# Patient Record
Sex: MALE | Race: OTHER | Employment: OTHER | URBAN - METROPOLITAN AREA
[De-identification: names, ages, dates, MRNs, and addresses within clinical notes are randomized per-mention and may not be internally consistent; named-entity substitution may affect disease eponyms.]

---

## 2018-01-25 PROBLEM — Z86.010 HISTORY OF COLON POLYPS: Status: ACTIVE | Noted: 2018-01-25

## 2021-06-01 PROBLEM — K14.8 LESION OF TONGUE: Status: ACTIVE | Noted: 2021-06-01

## 2022-03-19 PROBLEM — Z86.0100 HISTORY OF COLON POLYPS: Status: ACTIVE | Noted: 2018-01-25

## 2022-03-19 PROBLEM — K14.8 LESION OF TONGUE: Status: ACTIVE | Noted: 2021-06-01

## 2022-03-19 PROBLEM — Z86.010 HISTORY OF COLON POLYPS: Status: ACTIVE | Noted: 2018-01-25

## 2022-05-03 ENCOUNTER — TRANSCRIBE ORDER (OUTPATIENT)
Dept: SCHEDULING | Age: 82
End: 2022-05-03

## 2022-05-03 DIAGNOSIS — R13.10 DYSPHAGIA: Primary | ICD-10-CM

## 2022-07-23 ENCOUNTER — APPOINTMENT (OUTPATIENT)
Dept: CT IMAGING | Age: 82
End: 2022-07-23
Attending: EMERGENCY MEDICINE
Payer: MEDICARE

## 2022-07-23 ENCOUNTER — HOSPITAL ENCOUNTER (EMERGENCY)
Age: 82
Discharge: HOME | End: 2022-07-23
Attending: STUDENT IN AN ORGANIZED HEALTH CARE EDUCATION/TRAINING PROGRAM
Payer: MEDICARE

## 2022-07-23 VITALS
RESPIRATION RATE: 12 BRPM | HEIGHT: 70 IN | OXYGEN SATURATION: 100 % | WEIGHT: 196 LBS | TEMPERATURE: 98.4 F | DIASTOLIC BLOOD PRESSURE: 69 MMHG | BODY MASS INDEX: 28.06 KG/M2 | HEART RATE: 56 BPM | SYSTOLIC BLOOD PRESSURE: 160 MMHG

## 2022-07-23 DIAGNOSIS — S01.21XA LACERATION OF NOSE, INITIAL ENCOUNTER: Primary | ICD-10-CM

## 2022-07-23 DIAGNOSIS — S09.90XA CLOSED HEAD INJURY, INITIAL ENCOUNTER: ICD-10-CM

## 2022-07-23 DIAGNOSIS — R04.0 EPISTAXIS: ICD-10-CM

## 2022-07-23 PROCEDURE — 70450 CT HEAD/BRAIN W/O DYE: CPT

## 2022-07-23 PROCEDURE — 74011250636 HC RX REV CODE- 250/636: Performed by: EMERGENCY MEDICINE

## 2022-07-23 PROCEDURE — 90715 TDAP VACCINE 7 YRS/> IM: CPT | Performed by: EMERGENCY MEDICINE

## 2022-07-23 PROCEDURE — 70486 CT MAXILLOFACIAL W/O DYE: CPT

## 2022-07-23 PROCEDURE — 99284 EMERGENCY DEPT VISIT MOD MDM: CPT

## 2022-07-23 PROCEDURE — 72125 CT NECK SPINE W/O DYE: CPT

## 2022-07-23 PROCEDURE — 90471 IMMUNIZATION ADMIN: CPT

## 2022-07-23 RX ADMIN — TETANUS TOXOID, REDUCED DIPHTHERIA TOXOID AND ACELLULAR PERTUSSIS VACCINE, ADSORBED 0.5 ML: 5; 2.5; 8; 8; 2.5 SUSPENSION INTRAMUSCULAR at 14:38

## 2022-07-23 NOTE — ED PROVIDER NOTES
EMERGENCY DEPARTMENT HISTORY AND PHYSICAL EXAM    Date: 2022  Patient Name: Markus Morton    History of Presenting Illness     No chief complaint on file. History Provided By: Patient    Additional History (Context): Markus Morton is a 81 y.o. male with diabetes, hypertension, and hyperlipidemia who presents with facial injury today. He was at a  home and was walking back outside down for 5 steps and he forgot that there was an additional step off to the curb and missed stepped. Denies any premonitory dizziness lightheadedness chest pain shortness of breath or palpitations. He did not have syncope. He is on a blood thinner for his A. fib. Denies vomiting. PCP: Tanvi Yun MD    Current Facility-Administered Medications   Medication Dose Route Frequency Provider Last Rate Last Admin    diph,Rohith(AC),Tet Vac-PF (BOOSTRIX) suspension 0.5 mL  0.5 mL IntraMUSCular PRIOR TO DISCHARGE Michelle Cueto PA         Current Outpatient Medications   Medication Sig Dispense Refill    brimonidine (ALPHAGAN) 0.2 % ophthalmic solution INSTILL 1 DROP INTO BOTH EYES 3 TIMES A DAY      levoFLOXacin (LEVAQUIN) 750 mg tablet Take 1 Tablet by mouth daily. Take morning of the biopsy 1 Tablet 0    lisinopriL (PRINIVIL, ZESTRIL) 10 mg tablet Take 10 mg by mouth daily. tadalafiL (Cialis) 5 mg tablet Take 1 Tablet by mouth daily. 90 Tablet 3    omeprazole (PRILOSEC) 20 mg capsule daily. mometasone (NASONEX) 50 mcg/actuation nasal spray USE 2 SPRAYS IN EACH NOSTRIL TWICE DAILY      latanoprost (XALATAN) 0.005 % ophthalmic solution INSTILL 1 DROP INTO BOTH EYES AT BEDTIME      dorzolamide-timoloL (COSOPT) 22.3-6.8 mg/mL ophthalmic solution INSTILL 1 DROP 3 TIMES A DAY INTO BOTH EYES      metFORMIN (GLUCOPHAGE) 500 mg tablet TAKE 1 TABLET BY MOUTH TWICE A DAY WITH MEALS      atorvastatin (LIPITOR) 10 mg tablet atorvastatin 10 mg tablet   Take 1 tablet every day by oral route.          Past History     Past Medical History:  Past Medical History:   Diagnosis Date    Arthritis     Colon polyps     Diabetes ([address]     Elevated PSA     Glaucoma     Hyperlipidemia     Hypertension     Osteoarthritis     Tongue lesion        Past Surgical History:  Past Surgical History:   Procedure Laterality Date    COLONOSCOPY N/A 1/25/2018    COLONOSCOPY WITH BXS performed by Jamshid De Los Santos MD at [facility] ENDOSCOPY    HX COLONOSCOPY      HX TONSILLECTOMY      HX UROLOGICAL      CIRCUMCISION    HX UROLOGICAL  05/16/2022    TRANSRECTAL ULTRASOUND AND BIOPSY OF PROSTATE ; Shabbir guevara       Family History:  Family History   Problem Relation Age of Onset    Other Father         GLAUCOMA       Social History:  Social History     Tobacco Use    Smoking status: Former     Packs/day: 1.00     Years: 2.00     Pack years: 2.00     Types: Cigarettes    Smokeless tobacco: Never    Tobacco comments:     quit smoling 50 years ago   Substance Use Topics    Alcohol use: No    Drug use: No       Allergies:  No Known Allergies      Review of Systems   Review of Systems   Constitutional:  Negative for fever. HENT: Negative. Eyes:  Negative for visual disturbance. Respiratory:  Negative for shortness of breath. Cardiovascular:  Negative for chest pain and palpitations. Gastrointestinal:  Negative for nausea and vomiting. Endocrine: Negative. Genitourinary: Negative. Musculoskeletal: Negative. Negative for arthralgias and neck pain. Skin:  Positive for wound. Allergic/Immunologic: Negative. Neurological:  Negative for dizziness, syncope, weakness, light-headedness, numbness and headaches. Hematological:  Bruises/bleeds easily. Psychiatric/Behavioral: Negative. All Other Systems Negative  Physical Exam   There were no vitals filed for this visit. Physical Exam  Vitals and nursing note reviewed. Constitutional:       General: He is not in acute distress. Appearance: He is well-developed.  He is not ill-appearing, toxic-appearing or diaphoretic. HENT:      Head: Normocephalic. Comments: Skin tear to nasal bridge approx 3mm     Nose:      Comments: Left jared epistaxis, controlled. No blood in posterior pharynx. Neck:      Thyroid: No thyromegaly. Vascular: No carotid bruit. Trachea: No tracheal deviation. Cardiovascular:      Rate and Rhythm: Normal rate. Rhythm irregular. Heart sounds: Normal heart sounds. No murmur heard. No friction rub. No gallop. Pulmonary:      Effort: Pulmonary effort is normal. No respiratory distress. Breath sounds: Normal breath sounds. No stridor. No wheezing or rales. Chest:      Chest wall: No tenderness. Abdominal:      General: There is no distension. Palpations: Abdomen is soft. There is no mass. Tenderness: There is no abdominal tenderness. There is no guarding or rebound. Musculoskeletal:         General: Normal range of motion. Cervical back: Normal range of motion and neck supple. Skin:     General: Skin is warm and dry. Coloration: Skin is not pale. Neurological:      Mental Status: He is alert and oriented to person, place, and time. Psychiatric:         Speech: Speech normal.         Behavior: Behavior normal.         Thought Content: Thought content normal.         Judgment: Judgment normal.            Diagnostic Study Results     Labs -   No results found for this or any previous visit (from the past 12 hour(s)). Radiologic Studies -   CT HEAD WO CONT   Final Result      No CT evidence of acute intracranial pathology. Very mild burden of presumed chronic white matter microvascular ischemic changes   and cerebral volume loss. CT MAXILLOFACIAL WO CONT   Final Result      No evidence of acute facial fracture. Mild sinus mucosal disease. CT SPINE CERV WO CONT   Final Result      No evidence of acute fracture. Rather pronounced multilevel degenerative changes as detailed above. Mild anterolisthesis of C4 relative to C3 and C5. CT Results  (Last 48 hours)                 07/23/22 1321  CT HEAD WO CONT Final result    Impression:      No CT evidence of acute intracranial pathology. Very mild burden of presumed chronic white matter microvascular ischemic changes   and cerebral volume loss. Narrative:  NONCONTRAST HEAD CT       INDICATION: Above. Status post fall today with facial/head injury, on blood   thinner. COMPARISON: None       TECHNIQUE: Serial axial CT images through the brain were obtained without   administration of intravenous contrast. Additional coronal and sagittal   reformation images were also performed. All CT scans at this facility are performed using dose optimization technique as   appropriate to the performed exam, to include automated exposure control,   adjustment of the mA and/or kV according to patient's size (Including   appropriate matching for site-specific examinations), or use of iterative   reconstruction technique. FINDINGS:       There is mild diffuse prominence of the cortical sulci compatible with cerebral   volume loss, not grossly remarkable for the patient's age. There is no evidence of acute intracranial hemorrhage. No edema, midline shift or other mass effect is seen. No mass lesion   identified. There is very mild decreased attenuation in the predominantly deep   periventricular white matter compatible with nonspecific white matter disease,   likely chronic microvascular ischemic changes. No evidence of acute ischemic stroke/ cerebrovascular accident (CVA) is   identified. Head CT is often insensitive early to acute ischemic stroke. Orbits and paranasal sinuses better assessed on dedicated maxillofacial CT   reported separately. The mastoid air cells are aerated  The calvarium appears   intact.            07/23/22 1321  CT MAXILLOFACIAL WO CONT Final result    Impression: No evidence of acute facial fracture. Mild sinus mucosal disease. Narrative:  Facial CT scan, non contrast       CPT CODE: 27951       INDICATION: Above. Face/head trauma status post fall today. COMPARISON: No priors in PACS. Reference concurrent head and cervical spine CT   scans. TECHNIQUE: Serial axial CT images through the facial bones were helically   acquired spanning from the frontal sinuses through the mandible without   administration of IV contrast.  Coronal and sagittal reformation images were   also performed. All CT scans at this facility are performed using dose optimization technique as   appropriate to the performed exam, to include automated exposure control,   adjustment of the mA and/or kV according to patient's size (Including   appropriate matching for site-specific examinations), or use of iterative   reconstruction technique. FINDINGS:        No evidence of acute fracture is seen. The orbits appear intact. The globes   appear grossly round and symmetric. Retroconal intraorbital contents appear   grossly symmetric and intact. Curvilinear metallic density along the lateral   aspect of the right globe, presumably postsurgical.  Temporomandibular joints   appear grossly intact. Tiny mucous retention cyst versus polyp posterior right maxillary sinus. Mild   mucosal thickening in ethmoid air cells. No air-fluid levels. No gross nasal   septal deviation. Assessment of soft tissues is limited on noncontrast study, without gross   hematoma/soft tissue swelling identified. 07/23/22 1321  CT SPINE CERV WO CONT Final result    Impression:      No evidence of acute fracture. Rather pronounced multilevel degenerative changes as detailed above. Mild anterolisthesis of C4 relative to C3 and C5. Narrative:  CT SCAN OF THE CERVICAL SPINE, NON CONTRAST, WITH SAGITTAL AND CORONAL   REFORMATIONS       INDICATION: Above.  Facial/head trauma status post fall today, on blood thinner. COMPARISON: None       TECHNIQUE:  Serial axial CT images through the cervical spine were obtained   using helical technique from the skull base through C7 without administration of   intravenous contrast. Sagittal and coronal reformations were also performed. All CT scans at this facility are performed using dose optimization technique as   appropriate to the performed exam, to include automated exposure control,   adjustment of the mA and/or kV according to patient's size (Including   appropriate matching for site-specific examinations), or use of iterative   reconstruction technique. FINDINGS:       There is no evidence of acute fracture. No evidence of prevertebral soft tissue   swelling/hematoma is seen. The dens is intact. The skull base/C1/C2 relations   are maintained. The heights of the vertebral bodies are grossly maintained. Rather pronounced multilevel degenerative changes. There is severely decreased   disc space height at C3-C4 and C5-C6, C6-C7. Very large osteophytosis along the   anterior vertebral bodies, most conspicuous at C4 and C5. Mild anterolisthesis   of C4 relative to C3 and C5. Degenerative changes also noted at C1-C2 and at   facet joints, most conspicuous at C2-C3 bilaterally. Neural foraminal narrowing   best seen on the left at C3-C4 and bilaterally at C5-C6, C6-C7. CXR Results  (Last 48 hours)      None              Medical Decision Making   I am the first provider for this patient. I reviewed the vital signs, available nursing notes, past medical history, past surgical history, family history and social history. Vital Signs-Reviewed the patient's vital signs. Records Reviewed: Nursing Notes    Procedures:  Procedures    Provider Notes (Medical Decision Making): Skin tear to nasal bridge repaired with Steri-Strips. No acute fractures intracranial hemorrhage.   Patient is doing well and ready for discharge after his tetanus is updated. Advised him that if his epistaxis were to return to blow his nose use Afrin and apply pressure holding face and chin down. MED RECONCILIATION:  Current Facility-Administered Medications   Medication Dose Route Frequency    diph,Pertuss(AC),Tet Vac-PF (BOOSTRIX) suspension 0.5 mL  0.5 mL IntraMUSCular PRIOR TO DISCHARGE     Current Outpatient Medications   Medication Sig    brimonidine (ALPHAGAN) 0.2 % ophthalmic solution INSTILL 1 DROP INTO BOTH EYES 3 TIMES A DAY    levoFLOXacin (LEVAQUIN) 750 mg tablet Take 1 Tablet by mouth daily. Take morning of the biopsy    lisinopriL (PRINIVIL, ZESTRIL) 10 mg tablet Take 10 mg by mouth daily. tadalafiL (Cialis) 5 mg tablet Take 1 Tablet by mouth daily. omeprazole (PRILOSEC) 20 mg capsule daily. mometasone (NASONEX) 50 mcg/actuation nasal spray USE 2 SPRAYS IN EACH NOSTRIL TWICE DAILY    latanoprost (XALATAN) 0.005 % ophthalmic solution INSTILL 1 DROP INTO BOTH EYES AT BEDTIME    dorzolamide-timoloL (COSOPT) 22.3-6.8 mg/mL ophthalmic solution INSTILL 1 DROP 3 TIMES A DAY INTO BOTH EYES    metFORMIN (GLUCOPHAGE) 500 mg tablet TAKE 1 TABLET BY MOUTH TWICE A DAY WITH MEALS    atorvastatin (LIPITOR) 10 mg tablet atorvastatin 10 mg tablet   Take 1 tablet every day by oral route. Disposition:  home    DISCHARGE NOTE:   2:13 PM    Pt has been reexamined. Patient has no new complaints, changes, or physical findings. Care plan outlined and precautions discussed. Results of CT were reviewed with the patient. All medications were reviewed with the patient. All of pt's questions and concerns were addressed. Patient was instructed and agrees to follow up with PCP, as well as to return to the ED upon further deterioration. Patient is ready to go home.     Follow-up Information       Follow up With Specialties Details Why Contact Info    Arline Sanders MD Internal Medicine Physician Schedule an appointment as soon as possible for a visit in 2 days  [address]  [phone]      [facility] EMERGENCY DEPT Emergency Medicine  If symptoms worsen return immediately [address]  [phone]            Current Discharge Medication List          Diagnosis     Clinical Impression:   1. Laceration of nose, initial encounter    2.  Closed head injury, initial encounter    3. Epistaxis

## 2022-09-08 ENCOUNTER — HOSPITAL ENCOUNTER (OUTPATIENT)
Dept: RADIATION THERAPY | Age: 82
Discharge: HOME | End: 2022-09-08
Payer: MEDICARE

## 2022-09-08 PROCEDURE — 99205 OFFICE O/P NEW HI 60 MIN: CPT | Performed by: RADIOLOGY

## 2022-09-08 PROCEDURE — 99211 OFF/OP EST MAY X REQ PHY/QHP: CPT

## 2022-09-15 ENCOUNTER — TRANSCRIBE ORDER (OUTPATIENT)
Dept: RADIATION THERAPY | Age: 82
End: 2022-09-15

## 2022-09-15 DIAGNOSIS — C61 MALIGNANT NEOPLASM OF PROSTATE (HCC): Primary | ICD-10-CM

## 2022-09-20 ENCOUNTER — HOSPITAL ENCOUNTER (OUTPATIENT)
Dept: PREADMISSION TESTING | Age: 82
Discharge: HOME | End: 2022-09-20
Payer: MEDICARE

## 2022-09-20 DIAGNOSIS — C61 MALIGNANT NEOPLASM OF PROSTATE (HCC): ICD-10-CM

## 2022-09-20 LAB
ANION GAP SERPL CALC-SCNC: 5 MMOL/L (ref 3–18)
APPEARANCE UR: CLEAR
ATRIAL RATE: 49 BPM
BACTERIA URNS QL MICRO: ABNORMAL /HPF
BASOPHILS # BLD: 0 K/UL (ref 0–0.1)
BASOPHILS NFR BLD: 1 % (ref 0–2)
BILIRUB UR QL: NEGATIVE
BUN SERPL-MCNC: 23 MG/DL (ref 7–18)
BUN/CREAT SERPL: 13 (ref 12–20)
CALCIUM SERPL-MCNC: 9 MG/DL (ref 8.5–10.1)
CALCULATED P AXIS, ECG09: 30 DEGREES
CALCULATED R AXIS, ECG10: 11 DEGREES
CALCULATED T AXIS, ECG11: -118 DEGREES
CHLORIDE SERPL-SCNC: 110 MMOL/L (ref 100–111)
CO2 SERPL-SCNC: 27 MMOL/L (ref 21–32)
COLOR UR: YELLOW
CREAT SERPL-MCNC: 1.75 MG/DL (ref 0.6–1.3)
DIAGNOSIS, 93000: NORMAL
DIFFERENTIAL METHOD BLD: ABNORMAL
EOSINOPHIL # BLD: 0.2 K/UL (ref 0–0.4)
EOSINOPHIL NFR BLD: 3 % (ref 0–5)
EPITH CASTS URNS QL MICRO: ABNORMAL /LPF (ref 0–5)
ERYTHROCYTE [DISTWIDTH] IN BLOOD BY AUTOMATED COUNT: 12.8 % (ref 11.6–14.5)
GLUCOSE SERPL-MCNC: 104 MG/DL (ref 74–99)
GLUCOSE UR STRIP.AUTO-MCNC: NEGATIVE MG/DL
HCT VFR BLD AUTO: 30.9 % (ref 36–48)
HGB BLD-MCNC: 9.8 G/DL (ref 13–16)
HGB UR QL STRIP: NEGATIVE
IMM GRANULOCYTES # BLD AUTO: 0 K/UL (ref 0–0.04)
IMM GRANULOCYTES NFR BLD AUTO: 0 % (ref 0–0.5)
KETONES UR QL STRIP.AUTO: NEGATIVE MG/DL
LEUKOCYTE ESTERASE UR QL STRIP.AUTO: NEGATIVE
LYMPHOCYTES # BLD: 2.4 K/UL (ref 0.9–3.6)
LYMPHOCYTES NFR BLD: 47 % (ref 21–52)
MCH RBC QN AUTO: 30.7 PG (ref 24–34)
MCHC RBC AUTO-ENTMCNC: 31.7 G/DL (ref 31–37)
MCV RBC AUTO: 96.9 FL (ref 78–100)
MONOCYTES # BLD: 0.4 K/UL (ref 0.05–1.2)
MONOCYTES NFR BLD: 9 % (ref 3–10)
NEUTS SEG # BLD: 2.1 K/UL (ref 1.8–8)
NEUTS SEG NFR BLD: 40 % (ref 40–73)
NITRITE UR QL STRIP.AUTO: NEGATIVE
NRBC # BLD: 0 K/UL (ref 0–0.01)
NRBC BLD-RTO: 0 PER 100 WBC
P-R INTERVAL, ECG05: 200 MS
PH UR STRIP: 5.5 [PH] (ref 5–8)
PLATELET # BLD AUTO: 152 K/UL (ref 135–420)
PMV BLD AUTO: 11 FL (ref 9.2–11.8)
POTASSIUM SERPL-SCNC: 5.2 MMOL/L (ref 3.5–5.5)
PROT UR STRIP-MCNC: ABNORMAL MG/DL
Q-T INTERVAL, ECG07: 442 MS
QRS DURATION, ECG06: 98 MS
QTC CALCULATION (BEZET), ECG08: 399 MS
RBC # BLD AUTO: 3.19 M/UL (ref 4.35–5.65)
RBC #/AREA URNS HPF: ABNORMAL /HPF (ref 0–5)
SODIUM SERPL-SCNC: 142 MMOL/L (ref 136–145)
SP GR UR REFRACTOMETRY: 1.02 (ref 1–1.03)
UROBILINOGEN UR QL STRIP.AUTO: 0.2 EU/DL (ref 0.2–1)
VENTRICULAR RATE, ECG03: 49 BPM
WBC # BLD AUTO: 5.2 K/UL (ref 4.6–13.2)
WBC URNS QL MICRO: ABNORMAL /HPF (ref 0–4)

## 2022-09-20 PROCEDURE — 36415 COLL VENOUS BLD VENIPUNCTURE: CPT

## 2022-09-20 PROCEDURE — 93005 ELECTROCARDIOGRAM TRACING: CPT

## 2022-09-20 PROCEDURE — 85025 COMPLETE CBC W/AUTO DIFF WBC: CPT

## 2022-09-20 PROCEDURE — 81001 URINALYSIS AUTO W/SCOPE: CPT

## 2022-09-20 PROCEDURE — 80048 BASIC METABOLIC PNL TOTAL CA: CPT

## 2022-09-20 PROCEDURE — 87086 URINE CULTURE/COLONY COUNT: CPT

## 2022-09-21 LAB
BACTERIA SPEC CULT: NORMAL
SERVICE CMNT-IMP: NORMAL

## 2022-09-22 ENCOUNTER — TRANSCRIBE ORDER (OUTPATIENT)
Dept: SCHEDULING | Age: 82
End: 2022-09-22

## 2022-09-22 DIAGNOSIS — C61 PROSTATIC CANCER (HCC): Primary | ICD-10-CM

## 2022-10-05 ENCOUNTER — ANESTHESIA EVENT (OUTPATIENT)
Dept: SURGERY | Age: 82
End: 2022-10-05
Payer: MEDICARE

## 2022-10-05 NOTE — H&P
HISTORY AND PHYSICAL - RADIATION ONCOLOGY    Patient: Echo Mcwilliams        YOB: 1940  Patient ID: [id number]       Referring MD:  Champ Navarro  Radiation Oncologist: Dr. Catia Jenkins  Patient Diagnosis:  C61 - Malignant neoplasm of prostate, Diagnosed 5/16/2022 (Active) Stage IIIC, T1c, N0, M0, P>=10<20, G5    AJCC Staging has been reviewed. IDENTIFICATION:   81year old male with high risk prostate cancer (cT1c, GS 5+4, GS 4+5, PNI+, volume 28 mL, PSA 12.72) referred by Dr. Cristo Snyder for radiotherapy management of disease. HISTORY OF PRESENT ILLNESS: Mr. Андрей Ashby is an 81year old male with high risk prostate cancer. He has been followed for elevated PSA, which prompted further work up after increasing to 12.99 in December 2021. Prostate MRI was obtaiend March 28, 2022, which revealed highly suspicious lesion in the right anterior peripheral zone at the apex with broad abutment with the urethra and anterior bulging worrisome for extraprostatic extension and a left posterolateral peripheral zone lesion. TRUS biopsy was performed on May 16, 2022. Pathology demonstrated GS 5+9 in left base with PNI, left lateral apex, left lateral peripheral zone LILLIAN, and GS 4+5 in right anterior peripheral zone LILLIAN, left apex, left mid with PNI, left lateral mid with PNI, and left lateral base. 13 of 20 cores were positive, involving 20-99%. PSMA PET was obtained on July 13, 2022, which noted abnormal uptake in the prostate compatible with viable malignancy with no evidence of radiotracer avid metastatic disease. Current PSA is 12.72. Dr. Cristo Snyder has referred Mr. Андрей sAhby for radiotherapy management of disease. ADT was initiated with Degarelix on August 22, 2022. Today, Mr. Андрей Ashby feels well. He reports nocturia x3, weak stream, mild uinrary/frequency, and occasional intermittent stream. He denies hematuria, dysuria, diarrhea, bloody stools, abdominal pain, and bone pain.  He is a previous smoker, having smoked 1 ppd previously. There is no personal history of collagen vascular disorder, pacemaker, ulcerative colitis, Crohn's disease, or prior radiotherapy. Family history includes sister with breast cancer and a nephew with prostate cancer. Baseline:  PSA: 12.72  IPSS: 14  UQOL: 2  IIEF5: 17  EPICCP: 8    PSA History:  08/2022 - 12.72  12/2021 - 12.99  11/2021 - 13.46  05/2021 - 8.5  11/2020 - 7.76  09/2020 - 7.8  05/2020 - 4.9    STAGING:   C61 - Malignant neoplasm of prostate, Diagnosed 5/16/2022 (Active) Stage IIIC, T1c, N0, M0, P>=10<20, G5    PATHOLOGY:  C61 - Malignant neoplasm of prostate    Cell Histology Category: Adenocarcinoma; Type: Adenocarcinoma, NOS   Mayi Score Predominant/Major: 5; Secondary/Minor: 4; Total: 9   Core Assessment Taken: 20; Positive: 15       Medical History:  - Cancer (Prostate) ,  - diabetes,  - glaucoma,  - hypercholesterolemia,  - hypertension,  - osteoarthritis. Surgical History: Biopsy on 5/16/2022 (TRUS), colonoscopy on 1/25/2018 and tonsillectomy. Allergies:    None reported. Current Medications: Atorvastatin Calcium (10 mg) Tablet Oral   Brimonidine Tartrate 1 Drop(s) (of 0.2 %) Solution Ophthalmic t.i.d.   Dorzolamide HCl-Timolol Mal PF (22.3-6.8 mg/mL) Solution Ophthalmic t.i.d. Latanoprost (0.005 %) Solution Ophthalmic at bedtime   Lisinopril (10 mg) Tablet Oral daily   metFORMIN HCl (500 mg) Tablet Oral b.i.d.   Mometasone Furoate 2 Spray(s) (of 50 mcg/act) Aerosol Inhalation b.i.d. Tadalafil (5 mg) Tablet Oral daily   Timolol Maleate 1 Drop(s) (of 0.5 %) Solution Ophthalmic b.i.d. PQRS MEDICATION RECONCILIATION:  Medications documented and reviewed    Family History: Sister has experienced Cancer. Social History: Last screened on 9/7/2022 - Yes - but has quit. Smoked 1.0 pack/day. Last screened on 9/7/2022 - Never drank. Patient indicated use of the following products: cigarettes.  Patient indicated access to the following support systems: Adequate transportation available for expected visits, Lives with spouse, significant other, family, or friends, and Supportive family/friends willing to assist with needs. Patient indicated the following nutritional habits: Regular meals. Patient indicated participation in the following forms of activity: Daily activities. Education: high school  Employment:  with [employer], retired. Review of Systems:    Constitutional Denies lack of appetite, fatigue, fever, lethargy, malaise, night sweats, rigors / chills and change in weight. Allergic/Immunologic Denies allergies and adverse reactions. Head Denies alopecia. Eyes Denies blurred vision, double vision, lacrimation, night blindness, visual difficulties and photophobia. h/o glaucoma   ENMT Denies dysphagia, ear pain, epistaxis, esophagitis, problems with hearing, mouth dryness, oral bleeding, otitis, sinusitis, sputum production, stomatitis, altered taste and tinnitus. Neck Denies neck masses, muscle weakness, neck pain, decreased range of motion and swelling of the neck. Integumentary Denies alopecia, blistering, bruising, dry skin, facial burning, nail changes, photosensitivity, pruritus, rash and urticaria. Cardiovascular Denies arrhythmias, chest pain, dyspnea, edema, orthopnea and palpitations. Respiratory Complains of mild dyspnea associated with normal activity which is relieved by rest. Denies cough, hemoptysis, hiccoughs, pleuritic chest pain and wheezing. Gastrointestinal Denies abdominal pain, change in bowel habits, constipation, diarrhea, heartburn / dyspepsia, hematemesis, hematochezia, hemorrhoids, melena / GI bleeding, nausea, pain / cramping, satiety and vomiting. ROS Genitourinary (M) Complains of nocturia 1-3 times/night which is managed by frequent timed voiding.  Denies dysuria, frequency, hematuria, impotence, incontinence, renal stone disease, retrograde ejaculation, scrotal swelling, urgency and urine color change. Musculoskeletal Denies arthritis, bone pain, joint pain, muscle weakness and decreased range of motion. Neurologic Complains of mild headaches which is relieved by analgesics. Denies disorientation, dizziness, abnormal gait, insomnia, memory loss, motor weakness, sensory problems, paralysis, seizure and stroke. Psychiatric Denies delusions, hallucinations, mood swings, depression and euphoria. Endocrine Complains of Type 2 diabetes which is controlled with medication. Denies hot flashes, menstrual irregularities and thyroid disease. Hematologic/Lymphatic Denies easy bruising and tender or enlarged lymph nodes. PERFORMANCE STATUS: 1 - No physically strenuous activity, but ambulatory and able to carry out light or sedentary work (e.g. office work, light house work). (ECOG)    Vitals: Performed on 9/8/2022 10:57 AM   BMI - 29.331 kg/m2 (HIGH) -    Height - 68 in -    Weight - 192.9 lbs -    Temperature - 97.6 F -    Pulse - 50 /min (LOW) -    Respiration - 16 /min -    O2 Sat - 100 % -    Pain - 0 -    Fatigue - 0 -    Fall Risk - yes -    BP - 140/ 63 mm(hg)(/LOW) - ;    Physical Exam:    Constitutional No evidence of impaired alertness, inadequate appearance, premature or advanced chronologic age, uncooperativeness, developmental delays, altered mood and affect and disorientation. Head No evidence of abnormal cephalic and scars. Eyes No evidence of conjunctivitis, nonreactive pupil(s) and scleral abnormalities. Neck No evidence of tender or enlarged lymph nodes, neck abnormalities, restricted range of motion and enlarged thyroid gland. Chest No evidence of chest abnormalities and tender or enlarged lymph nodes. Cardiovascular No evidence of abnormal heart rate, heart arrhythmia and abnormal heart sounds. Respiratory No evidence of abnormal breath sounds. Abdomen No evidence of abdominal abnormalities, abnormal bowel sounds, hepatomegaly and splenomegaly.    Extremities No evidence of lower extremities abnormalities and upper extremities abnormalities. Back/Spine No evidence of reduced flexibility and abnormal spinal curvature. Musculoskeletal No evidence of bone abnormalities, joint abnormalities, compromised muscle tone and restricted range of motion. Neurologic No evidence of uncoordinated gait. Psychiatric No evidence of altered affect, lack of comprehension and disorientation. Hematologic/Lymphatic No evidence of tender or enlarged lymph nodes. TIME AND COUNSELING:  Up to 60 minutes were spent with the patient acquiring the vital information vital to the case. The patient was examined to verify findings as related in the HPI, as well as other areas as needed. Time was allowed for all questions about the proposed course of care to be answered. Greater than 50% time was spent face to face with the patient in counseling and coordination of care. Impression:   81year old male with high risk prostate cancer (cT1c, GS 5+4, GS 4+5, PNI+, volume 28 mL, PSA 12.72) referred by Dr. Jada Wheat for radiotherapy management of disease. Plan:   I discussed risk grouping of prostate cancer with Mr. Rodney Michelle and explained that his disease is considered high risk. I thoroughly explained the potential acute and late side effects of IMRT/IGRT including (but not limited to) urinary symptoms (frequency, urgency, nocturia, hematuria, dysuria, incontinence), fatigue, osteopenia, erectile dysfunction, dry ejaculate, infertility, diarrhea, GI bleed, bladder inflammation/injury, bowel inflammation/injury that could require surgery, rectal injury that could require surgery, and skin changes/pain/desquamation. I discussed the importance of a comfortably full bladder and reproducibly empty rectum as well as bowel management for IMRT. I explained the use of fiducial markers and SpaceOAR gel for targeting and reducing the dose to the rectum, respectively.   I then answered all of the patient's questions regarding proton therapy, and explained that there is no evidence in the literature that shows proton therapy as a superior treatment modality in comparison to photon radiation treatment for prostate cancer specifically, with regard to either long-term disease control rates or toxicity. Given Mr. Jessica Ospina LUTS, recommend standard IMRT/IGRT to treat the prostate, seminal vesicles, and pelvic nodes. Mr. Jessica Ospina questions were answered, and he expressed an understanding of the above. Mr. Micaela Johnson would like to proceed with fiducial marker and SpaceOAR placement followed by 44 fractions of IMRT/IGRT to treat his prostate cancer. We will coordinate fiducial marker and SpaceOAR placement at [facility] in October 2022. He will then undergo CT simulation and MRI pelvis for planning one week after his procedure. Plan for 7920 cGy in 44 fractions. Will include prostate, seminal vesicles, and pelvic nodes given high risk disease.

## 2022-10-06 ENCOUNTER — HOSPITAL ENCOUNTER (OUTPATIENT)
Dept: RADIATION THERAPY | Age: 82
Discharge: HOME | End: 2022-10-06
Payer: MEDICARE

## 2022-10-06 ENCOUNTER — HOSPITAL ENCOUNTER (OUTPATIENT)
Age: 82
Setting detail: OUTPATIENT SURGERY
Discharge: HOME | End: 2022-10-06
Attending: RADIOLOGY | Admitting: RADIOLOGY
Payer: MEDICARE

## 2022-10-06 ENCOUNTER — ANESTHESIA (OUTPATIENT)
Dept: SURGERY | Age: 82
End: 2022-10-06
Payer: MEDICARE

## 2022-10-06 VITALS
DIASTOLIC BLOOD PRESSURE: 60 MMHG | HEIGHT: 70 IN | SYSTOLIC BLOOD PRESSURE: 161 MMHG | HEART RATE: 45 BPM | BODY MASS INDEX: 27.37 KG/M2 | OXYGEN SATURATION: 100 % | WEIGHT: 191.2 LBS | RESPIRATION RATE: 9 BRPM | TEMPERATURE: 96.8 F

## 2022-10-06 LAB
GLUCOSE BLD STRIP.AUTO-MCNC: 114 MG/DL (ref 70–110)
GLUCOSE BLD STRIP.AUTO-MCNC: 166 MG/DL (ref 70–110)
HBA1C MFR BLD: 7 % (ref 4.2–5.6)

## 2022-10-06 PROCEDURE — 77030015736 HC BLLN ENDOCAV CIVC -B: Performed by: RADIOLOGY

## 2022-10-06 PROCEDURE — 82962 GLUCOSE BLOOD TEST: CPT

## 2022-10-06 PROCEDURE — 74011250637 HC RX REV CODE- 250/637: Performed by: RADIOLOGY

## 2022-10-06 PROCEDURE — 55876 PLACE RT DEVICE/MARKER PROS: CPT | Performed by: RADIOLOGY

## 2022-10-06 PROCEDURE — C1889 IMPLANT/INSERT DEVICE, NOC: HCPCS | Performed by: RADIOLOGY

## 2022-10-06 PROCEDURE — 74011000250 HC RX REV CODE- 250: Performed by: RADIOLOGY

## 2022-10-06 PROCEDURE — 76210000021 HC REC RM PH II 0.5 TO 1 HR: Performed by: RADIOLOGY

## 2022-10-06 PROCEDURE — 77030040922 HC BLNKT HYPOTHRM STRY -A: Performed by: RADIOLOGY

## 2022-10-06 PROCEDURE — A4648 IMPLANTABLE TISSUE MARKER: HCPCS | Performed by: RADIOLOGY

## 2022-10-06 PROCEDURE — 76060000032 HC ANESTHESIA 0.5 TO 1 HR: Performed by: RADIOLOGY

## 2022-10-06 PROCEDURE — 74011000250 HC RX REV CODE- 250: Performed by: NURSE ANESTHETIST, CERTIFIED REGISTERED

## 2022-10-06 PROCEDURE — 99100 ANES PT EXTEME AGE<1 YR&>70: CPT | Performed by: ANESTHESIOLOGY

## 2022-10-06 PROCEDURE — 00902 ANES ANORECTAL PX: CPT | Performed by: ANESTHESIOLOGY

## 2022-10-06 PROCEDURE — 74011250636 HC RX REV CODE- 250/636: Performed by: RADIOLOGY

## 2022-10-06 PROCEDURE — 74011250636 HC RX REV CODE- 250/636: Performed by: NURSE ANESTHETIST, CERTIFIED REGISTERED

## 2022-10-06 PROCEDURE — 76210000006 HC OR PH I REC 0.5 TO 1 HR: Performed by: RADIOLOGY

## 2022-10-06 PROCEDURE — 74011636637 HC RX REV CODE- 636/637: Performed by: NURSE ANESTHETIST, CERTIFIED REGISTERED

## 2022-10-06 PROCEDURE — 99100 ANES PT EXTEME AGE<1 YR&>70: CPT | Performed by: NURSE ANESTHETIST, CERTIFIED REGISTERED

## 2022-10-06 PROCEDURE — 76010000160 HC OR TIME 0.5 TO 1 HR INTENSV-TIER 1: Performed by: RADIOLOGY

## 2022-10-06 PROCEDURE — 00902 ANES ANORECTAL PX: CPT | Performed by: NURSE ANESTHETIST, CERTIFIED REGISTERED

## 2022-10-06 PROCEDURE — 55874 TPRNL PLMT BIODEGRDABL MATRL: CPT | Performed by: RADIOLOGY

## 2022-10-06 PROCEDURE — 83036 HEMOGLOBIN GLYCOSYLATED A1C: CPT

## 2022-10-06 PROCEDURE — 74011250637 HC RX REV CODE- 250/637: Performed by: NURSE ANESTHETIST, CERTIFIED REGISTERED

## 2022-10-06 PROCEDURE — 2709999900 HC NON-CHARGEABLE SUPPLY: Performed by: RADIOLOGY

## 2022-10-06 PROCEDURE — 77030040361 HC SLV COMPR DVT MDII -B: Performed by: RADIOLOGY

## 2022-10-06 RX ORDER — PROPOFOL 10 MG/ML
INJECTION, EMULSION INTRAVENOUS AS NEEDED
Status: DISCONTINUED | OUTPATIENT
Start: 2022-10-06 | End: 2022-10-06 | Stop reason: HOSPADM

## 2022-10-06 RX ORDER — INSULIN LISPRO 100 [IU]/ML
INJECTION, SOLUTION INTRAVENOUS; SUBCUTANEOUS ONCE
Status: DISCONTINUED | OUTPATIENT
Start: 2022-10-06 | End: 2022-10-06 | Stop reason: HOSPADM

## 2022-10-06 RX ORDER — FAMOTIDINE 20 MG/1
20 TABLET, FILM COATED ORAL ONCE
Status: COMPLETED | OUTPATIENT
Start: 2022-10-06 | End: 2022-10-06

## 2022-10-06 RX ORDER — DEXTROSE MONOHYDRATE 100 MG/ML
0-250 INJECTION, SOLUTION INTRAVENOUS AS NEEDED
Status: DISCONTINUED | OUTPATIENT
Start: 2022-10-06 | End: 2022-10-06 | Stop reason: HOSPADM

## 2022-10-06 RX ORDER — MAGNESIUM SULFATE 100 %
4 CRYSTALS MISCELLANEOUS AS NEEDED
Status: DISCONTINUED | OUTPATIENT
Start: 2022-10-06 | End: 2022-10-06 | Stop reason: HOSPADM

## 2022-10-06 RX ORDER — SODIUM CHLORIDE, SODIUM LACTATE, POTASSIUM CHLORIDE, CALCIUM CHLORIDE 600; 310; 30; 20 MG/100ML; MG/100ML; MG/100ML; MG/100ML
25 INJECTION, SOLUTION INTRAVENOUS CONTINUOUS
Status: DISCONTINUED | OUTPATIENT
Start: 2022-10-06 | End: 2022-10-06 | Stop reason: HOSPADM

## 2022-10-06 RX ORDER — LIDOCAINE HYDROCHLORIDE 20 MG/ML
INJECTION, SOLUTION EPIDURAL; INFILTRATION; INTRACAUDAL; PERINEURAL AS NEEDED
Status: DISCONTINUED | OUTPATIENT
Start: 2022-10-06 | End: 2022-10-06 | Stop reason: HOSPADM

## 2022-10-06 RX ORDER — INSULIN LISPRO 100 [IU]/ML
INJECTION, SOLUTION INTRAVENOUS; SUBCUTANEOUS ONCE
Status: COMPLETED | OUTPATIENT
Start: 2022-10-06 | End: 2022-10-06

## 2022-10-06 RX ORDER — OXYCODONE AND ACETAMINOPHEN 5; 325 MG/1; MG/1
1 TABLET ORAL AS NEEDED
Status: DISCONTINUED | OUTPATIENT
Start: 2022-10-06 | End: 2022-10-06 | Stop reason: HOSPADM

## 2022-10-06 RX ORDER — EPHEDRINE SULFATE/0.9% NACL/PF 50 MG/5 ML
SYRINGE (ML) INTRAVENOUS AS NEEDED
Status: DISCONTINUED | OUTPATIENT
Start: 2022-10-06 | End: 2022-10-06 | Stop reason: HOSPADM

## 2022-10-06 RX ORDER — FENTANYL CITRATE 50 UG/ML
INJECTION, SOLUTION INTRAMUSCULAR; INTRAVENOUS AS NEEDED
Status: DISCONTINUED | OUTPATIENT
Start: 2022-10-06 | End: 2022-10-06 | Stop reason: HOSPADM

## 2022-10-06 RX ORDER — ONDANSETRON 2 MG/ML
INJECTION INTRAMUSCULAR; INTRAVENOUS AS NEEDED
Status: DISCONTINUED | OUTPATIENT
Start: 2022-10-06 | End: 2022-10-06 | Stop reason: HOSPADM

## 2022-10-06 RX ORDER — FENTANYL CITRATE 50 UG/ML
50 INJECTION, SOLUTION INTRAMUSCULAR; INTRAVENOUS
Status: DISCONTINUED | OUTPATIENT
Start: 2022-10-06 | End: 2022-10-06 | Stop reason: HOSPADM

## 2022-10-06 RX ORDER — FENTANYL CITRATE 50 UG/ML
25 INJECTION, SOLUTION INTRAMUSCULAR; INTRAVENOUS AS NEEDED
Status: DISCONTINUED | OUTPATIENT
Start: 2022-10-06 | End: 2022-10-06 | Stop reason: HOSPADM

## 2022-10-06 RX ORDER — LIDOCAINE HYDROCHLORIDE 10 MG/ML
0.1 INJECTION, SOLUTION EPIDURAL; INFILTRATION; INTRACAUDAL; PERINEURAL AS NEEDED
Status: DISCONTINUED | OUTPATIENT
Start: 2022-10-06 | End: 2022-10-06 | Stop reason: HOSPADM

## 2022-10-06 RX ADMIN — SODIUM CHLORIDE, POTASSIUM CHLORIDE, SODIUM LACTATE AND CALCIUM CHLORIDE 25 ML/HR: 600; 310; 30; 20 INJECTION, SOLUTION INTRAVENOUS at 08:09

## 2022-10-06 RX ADMIN — SODIUM PHOSPHATE, DIBASIC AND SODIUM PHOSPHATE, MONOBASIC 1 ENEMA: 7; 19 ENEMA RECTAL at 08:25

## 2022-10-06 RX ADMIN — FENTANYL CITRATE 25 MCG: 50 INJECTION, SOLUTION INTRAMUSCULAR; INTRAVENOUS at 08:56

## 2022-10-06 RX ADMIN — ONDANSETRON 4 MG: 2 INJECTION INTRAMUSCULAR; INTRAVENOUS at 09:29

## 2022-10-06 RX ADMIN — LIDOCAINE HYDROCHLORIDE 40 MG: 20 INJECTION, SOLUTION EPIDURAL; INFILTRATION; INTRACAUDAL; PERINEURAL at 08:56

## 2022-10-06 RX ADMIN — Medication 5 MG: at 09:02

## 2022-10-06 RX ADMIN — PROPOFOL 150 MG: 10 INJECTION, EMULSION INTRAVENOUS at 08:56

## 2022-10-06 RX ADMIN — CEFAZOLIN SODIUM 2 G: 1 INJECTION, POWDER, FOR SOLUTION INTRAMUSCULAR; INTRAVENOUS at 08:59

## 2022-10-06 RX ADMIN — FENTANYL CITRATE 25 MCG: 50 INJECTION, SOLUTION INTRAMUSCULAR; INTRAVENOUS at 09:19

## 2022-10-06 RX ADMIN — Medication 5 MG: at 09:10

## 2022-10-06 RX ADMIN — Medication 3 UNITS: at 08:14

## 2022-10-06 RX ADMIN — Medication 5 MG: at 09:14

## 2022-10-06 RX ADMIN — FAMOTIDINE 20 MG: 20 TABLET ORAL at 07:58

## 2022-10-06 NOTE — ANESTHESIA POSTPROCEDURE EVALUATION
Procedure(s):  TRANSRECTAL ULTRASOUND GUIDED FIDUCIAL MARKER PLACEMENT/SPACE OAR GEL. general    Anesthesia Post Evaluation      Multimodal analgesia: multimodal analgesia used between 6 hours prior to anesthesia start to PACU discharge  Patient location during evaluation: bedside  Patient participation: complete - patient participated  Level of consciousness: awake  Pain score: 0  Pain management: adequate  Airway patency: patent  Anesthetic complications: no  Cardiovascular status: stable  Respiratory status: acceptable  Hydration status: acceptable  Post anesthesia nausea and vomiting:  controlled  Final Post Anesthesia Temperature Assessment:  Normothermia (36.0-37.5 degrees C)      INITIAL Post-op Vital signs:   Vitals Value Taken Time   /58 10/06/22 1010   Temp 36.3 °C (97.4 °F) 10/06/22 1000   Pulse 45 10/06/22 1013   Resp 11 10/06/22 1013   SpO2 100 % 10/06/22 1013   Vitals shown include unvalidated device data.

## 2022-10-06 NOTE — OP NOTES
Operative Note    Patient: Milagros Phillips  YOB: 1940  MRN: [id number]    Date of Procedure: 10/6/2022     Pre-Op Diagnosis: Prostate cancer ([address] Sophie Edmund    Post-Op Diagnosis: Same as preoperative diagnosis. Procedure(s):  TRANSRECTAL ULTRASOUND GUIDED FIDUCIAL MARKER PLACEMENT/SPACE OAR GEL    Surgeon(s):  Maggie Edward MD    Surgical Assistant: None    Anesthesia: General     Estimated Blood Loss (mL):  Minimal    Complications: None    Specimens: * No specimens in log *     Implants:   Implant Name Type Inv. Item Serial No.  Lot No. LRB No. Used Action   fiducial marker kit     [id number]/A 3 Implanted       Drains: * No LDAs found *    Findings: Prostate identified on ultrasound. Detailed Description of Procedure: Mr. Marylou Araiza was brought to OR 8 at [facility]. He was then positioned on the distal end of the ultrasound table in the dorsal lithotomy position with his legs in [address] [place], [place] [address]way and his hips slightly hyperflexed. Care was taken to align the patient straight on the table. The perineum was then prepped with HIBICLENS. The Biplanar multi-frequency B & K transrectal ultrasound transducer was then carefully introduced into the rectum and attached to the digital, electronic stepper unit which was mounted to the stabilizer unit. The gland was evaluated on both transverse and longitudinal imaging. Three 18G steel trocar needles with 60 degree bevels pre-loaded with single fiducial markers each spaced 2cm were then placed in turn under direct transrectal ultrasound guidance into the right and then left lobes of the prostate. The markers on the right were placed such that the cephalic extent was near the right base. The marker on the left was implanted in the same fashion such that the caudal extent was near the left apex. The position of all three markers was verified on ultrasound imaging and deemed to be satisfactory.   Next, a 6 inch, 18G spinal needle was then placed in the posterior midline and advanced to the recto-prostatic space near midgland. 1 cc of saline was injected to confirm the presence of a good tissue plane. The liquid materials and powder provided in the [facility] kit were then mixed as per protocol and about 10 cc were simultaneously injected into the same space, giving an immediate minimum 7 mm increased spacing from the prostate to the outside of the rectal wall. At the completion of the procedure, which was tolerated well, the perineum was cleaned, and the puncture sites were covered with an antibiotic dressing. He was discharged in good condition. He was given a prescription for Ciprofloxacin 500 mg, BID x 3 days and Naproxen, with instructions to begin tomorrow. He was given an appointment to return for CT based simulation and treatment planning. The IMRT/IGRT phase of treatment will then follow soon thereafter.        Electronically Signed by Catia Jenkins MD on 10/6/2022 at 12:02 PM

## 2022-10-06 NOTE — INTERVAL H&P NOTE
Update History & Physical    The Patient's History and Physical of October 6,   The procedure - fiducial marker and SpaceOAR hydrogel placement was reviewed with the patient and I examined the patient. There was no change. The surgical site was confirmed by the patient and me. Patient did not perform enema, one will be ordered. Plan:  The risk, benefits, expected outcome, and alternative to the recommended procedure have been discussed with the patient. Patient understands and wants to proceed with the procedure.     Electronically signed by Deangelo Linder MD on 10/6/2022 at 8:23 AM

## 2022-10-06 NOTE — BRIEF OP NOTE
Brief Postoperative Note    Patient: Ehsan Bryson  YOB: 1940  MRN: [id number]    Date of Procedure: 10/6/2022     Pre-Op Diagnosis: Prostate cancer ([address] Marcia Hanley    Post-Op Diagnosis: Same as preoperative diagnosis. Procedure(s):  TRANSRECTAL ULTRASOUND GUIDED FIDUCIAL MARKER PLACEMENT/SPACE OAR GEL    Surgeon(s):  Hayden Weber MD    Surgical Assistant: None    Anesthesia: General     Estimated Blood Loss (mL): Minimal    Complications: None    Specimens: * No specimens in log *     Implants:   Implant Name Type Inv. Item Serial No.  Lot No. LRB No. Used Action   fiducial marker kit     [id number] N/A 3 Implanted       Drains: * No LDAs found *    Findings: Prostate identified on ultrasound. 3 gold fiducial markers placed, 2 on patient's right, 1 on patient's left, within the prostatic parenchyma. SpaceOAR hydrogel then injected in the fat plane posterior to the prostate, anterior to the rectum. No complications, tolerated well, patient sent to PACU.      Electronically Signed by Esther Padilla MD on 10/6/2022 at 9:56 AM

## 2022-10-06 NOTE — ANESTHESIA PREPROCEDURE EVALUATION
Relevant Problems   No relevant active problems       Anesthetic History   No history of anesthetic complications            Review of Systems / Medical History  Patient summary reviewed and pertinent labs reviewed    Pulmonary  Within defined limits                 Neuro/Psych   Within defined limits           Cardiovascular    Hypertension: well controlled              Exercise tolerance: >4 METS     GI/Hepatic/Renal  Within defined limits              Endo/Other    Diabetes: well controlled, type 2    Arthritis and cancer (Prostate cancer)     Other Findings            Physical Exam    Airway  Mallampati: II  TM Distance: 4 - 6 cm  Neck ROM: normal range of motion   Mouth opening: Normal     Cardiovascular  Regular rate and rhythm,  S1 and S2 normal,  no murmur, click, rub, or gallop             Dental      Comments: 4 teeth in mouth   Pulmonary  Breath sounds clear to auscultation               Abdominal  GI exam deferred       Other Findings            Anesthetic Plan    ASA: 3  Anesthesia type: general          Induction: Intravenous  Anesthetic plan and risks discussed with: Patient

## 2022-10-06 NOTE — DISCHARGE INSTRUCTIONS
DISCHARGE SUMMARY from Nurse    PATIENT INSTRUCTIONS:    After general anesthesia or intravenous sedation, for 24 hours or while taking prescription Narcotics:  Limit your activities  Do not drive and operate hazardous machinery  Do not make important personal or business decisions  Do  not drink alcoholic beverages  If you have not urinated within 8 hours after discharge, please contact your surgeon on call. Report the following to your surgeon:  Excessive pain, swelling, redness or odor of or around the surgical area  Temperature over 100.5  Nausea and vomiting lasting longer than 4 hours or if unable to take medications  Any signs of decreased circulation or nerve impairment to extremity: change in color, persistent  numbness, tingling, coldness or increase pain  Any questions      These are general instructions for a healthy lifestyle:    No smoking/ No tobacco products/ Avoid exposure to second hand smoke  Surgeon General's Warning:  Quitting smoking now greatly reduces serious risk to your health. Obesity, smoking, and sedentary lifestyle greatly increases your risk for illness    A healthy diet, regular physical exercise & weight monitoring are important for maintaining a healthy lifestyle    You may be retaining fluid if you have a history of heart failure or if you experience any of the following symptoms:  Weight gain of 3 pounds or more overnight or 5 pounds in a week, increased swelling in our hands or feet or shortness of breath while lying flat in bed. Please call your doctor as soon as you notice any of these symptoms; do not wait until your next office visit. The discharge information has been reviewed with the patient and wife. The patient and wife verbalized understanding.   Discharge medications reviewed with the patient and wife and appropriate educational materials and side effects teaching were provided.   ___________________________________________________________________________________________________________________________________  Patient armband removed and shredded

## 2022-10-18 ENCOUNTER — HOSPITAL ENCOUNTER (OUTPATIENT)
Age: 82
Discharge: HOME | End: 2022-10-18
Attending: RADIOLOGY
Payer: MEDICARE

## 2022-10-18 ENCOUNTER — APPOINTMENT (OUTPATIENT)
Dept: RADIATION THERAPY | Age: 82
End: 2022-10-18
Payer: MEDICARE

## 2022-10-18 DIAGNOSIS — C61 PROSTATIC CANCER (HCC): ICD-10-CM

## 2022-10-18 PROCEDURE — 76498 UNLISTED MR PROCEDURE: CPT

## 2022-10-19 ENCOUNTER — HOSPITAL ENCOUNTER (OUTPATIENT)
Dept: RADIATION THERAPY | Age: 82
Discharge: HOME | End: 2022-10-19
Payer: MEDICARE

## 2022-10-19 PROCEDURE — 77263 THER RADIOLOGY TX PLNG CPLX: CPT | Performed by: RADIOLOGY

## 2022-10-20 ENCOUNTER — HOSPITAL ENCOUNTER (OUTPATIENT)
Dept: RADIATION THERAPY | Age: 82
Discharge: HOME | End: 2022-10-20
Payer: MEDICARE

## 2022-10-20 ENCOUNTER — TRANSCRIBE ORDER (OUTPATIENT)
Dept: RADIATION THERAPY | Age: 82
End: 2022-10-20

## 2022-10-20 DIAGNOSIS — C61 MALIGNANT NEOPLASM OF PROSTATE (HCC): Primary | ICD-10-CM

## 2022-10-20 PROCEDURE — 77334 RADIATION TREATMENT AID(S): CPT

## 2024-09-18 ENCOUNTER — HOSPITAL ENCOUNTER (OUTPATIENT)
Facility: HOSPITAL | Age: 84
Setting detail: RECURRING SERIES
Discharge: HOME | End: 2024-09-21
Payer: MEDICARE

## 2024-09-18 PROCEDURE — 97161 PT EVAL LOW COMPLEX 20 MIN: CPT

## 2024-09-24 ENCOUNTER — HOSPITAL ENCOUNTER (OUTPATIENT)
Facility: HOSPITAL | Age: 84
Setting detail: RECURRING SERIES
End: 2024-09-24
Payer: MEDICARE

## 2024-09-24 ENCOUNTER — HOSPITAL ENCOUNTER (OUTPATIENT)
Facility: HOSPITAL | Age: 84
Setting detail: RECURRING SERIES
Discharge: HOME | End: 2024-09-27
Payer: MEDICARE

## 2024-09-24 PROCEDURE — 97530 THERAPEUTIC ACTIVITIES: CPT

## 2024-09-24 PROCEDURE — 97110 THERAPEUTIC EXERCISES: CPT

## 2024-09-24 PROCEDURE — 97112 NEUROMUSCULAR REEDUCATION: CPT

## 2024-09-24 NOTE — PROGRESS NOTES
PHYSICAL / OCCUPATIONAL THERAPY - DAILY TREATMENT NOTE    Patient Name: Arjun Jeronimo    Date: 2024    : 1940  Insurance: Payor: [employer] MEDICARE / Plan: [employer] CHOICE-PPO MEDICARE / Product Type: *No Product type* /      Patient  verified Yes     Visit #   Current / Total 2 24   Time   In / Out 4:14 4:55   Pain   In / Out 0 0   Subjective Functional Status/Changes: Pt reports that he started part of the HEP, denied any issues/concerns.     TREATMENT AREA =  Pain in left leg [M79.605]     OBJECTIVE         Therapeutic Procedures:    Tx Min Billable or 1:1 Min (if diff from Tx Min) Procedure, Rationale, Specifics   19  [id number] Therapeutic Exercise (timed):  increase ROM, strength, coordination, balance, and proprioception to improve patient's ability to progress to PLOF and address remaining functional goals. (see flow sheet as applicable)     Details if applicable:       9  74346 Neuromuscular Re-Education (timed):  improve balance, coordination, kinesthetic sense, posture, core stability and proprioception to improve patient's ability to develop conscious control of individual muscles and awareness of position of extremities in order to progress to PLOF and address remaining functional goals. (see flow sheet as applicable)     Details if applicable:     13  78552 Therapeutic Activity (timed):  use of dynamic activities replicating functional movements to increase ROM, strength, coordination, balance, and proprioception in order to improve patient's ability to progress to PLOF and address remaining functional goals.  (see flow sheet as applicable)     Details if applicable:            Details if applicable:            Details if applicable:     41 41 [facility] Totals Reminder: bill using total billable min of TIMED therapeutic procedures (example: do not include dry needle or estim unattended, both untimed codes, in totals to left)  8-22 min = 1 unit; 23-37 min = 2 units; 38-52 min = 3 units;

## 2024-10-01 ENCOUNTER — HOSPITAL ENCOUNTER (OUTPATIENT)
Facility: HOSPITAL | Age: 84
Setting detail: RECURRING SERIES
Discharge: HOME | End: 2024-10-04
Payer: MEDICARE

## 2024-10-01 PROCEDURE — 97110 THERAPEUTIC EXERCISES: CPT

## 2024-10-01 PROCEDURE — 97112 NEUROMUSCULAR REEDUCATION: CPT

## 2024-10-01 PROCEDURE — 97530 THERAPEUTIC ACTIVITIES: CPT

## 2024-10-01 NOTE — PROGRESS NOTES
PHYSICAL / OCCUPATIONAL THERAPY - DAILY TREATMENT NOTE    Patient Name: Arjun Jeronimo    Date: 10/1/2024    : 1940  Insurance: Payor: [employer] MEDICARE / Plan: [employer] CHOICE-PPO MEDICARE / Product Type: *No Product type* /      Patient  verified Yes     Visit #   Current / Total 3 24   Time   In / Out 8:20 9:00   Pain   In / Out 3 1   Subjective Functional Status/Changes: \"Im doing great today - I think things are getting better\"     TREATMENT AREA =  Pain in left leg [M79.605]     OBJECTIVE         Therapeutic Procedures:    Tx Min Billable or 1:1 Min (if diff from Tx Min) Procedure, Rationale, Specifics   22  71996 Therapeutic Exercise (timed):  increase ROM, strength, coordination, balance, and proprioception to improve patient's ability to progress to PLOF and address remaining functional goals. (see flow sheet as applicable)     Details if applicable:       10 10 03493 Neuromuscular Re-Education (timed):  improve balance, coordination, kinesthetic sense, posture, core stability and proprioception to improve patient's ability to develop conscious control of individual muscles and awareness of position of extremities in order to progress to PLOF and address remaining functional goals. (see flow sheet as applicable)     Details if applicable:     8 8 [id number] Therapeutic Activity (timed):  use of dynamic activities replicating functional movements to increase ROM, strength, coordination, balance, and proprioception in order to improve patient's ability to progress to PLOF and address remaining functional goals.  (see flow sheet as applicable)     Details if applicable:            Details if applicable:            Details if applicable:     40 40 [facility] Totals Reminder: bill using total billable min of TIMED therapeutic procedures (example: do not include dry needle or estim unattended, both untimed codes, in totals to left)  8-22 min = 1 unit; 23-37 min = 2 units; 38-52 min = 3 units; 53-67 min = 4

## 2024-10-08 ENCOUNTER — HOSPITAL ENCOUNTER (OUTPATIENT)
Facility: HOSPITAL | Age: 84
Setting detail: RECURRING SERIES
Discharge: HOME | End: 2024-10-11
Payer: MEDICARE

## 2024-10-08 PROCEDURE — 97110 THERAPEUTIC EXERCISES: CPT

## 2024-10-08 PROCEDURE — 97112 NEUROMUSCULAR REEDUCATION: CPT

## 2024-10-08 NOTE — PROGRESS NOTES
Flexion 4+ 3+      3.  Pt will improve bilateral SLS to 8 seconds to improve stability at home and within the community.   Status at last note/certification: SLS: R: <1 seconds. , L: <1 seconds  Current: progressing R - 1.53 sec, L - 3.46 sec (10/1/2024)     4.  Pt will improve bilateral hip internal rotation to 25 degrees to improve joint mobility and mobility at home and within the community.   Status at last note/certification: R-10 deg, L- 8 deg     Next PN/ RC due 10/17/24  Auth due (visit number/ date) 24 sessions, expires 12/20/24    PLAN  - Continue Plan of Care  - Upgrade activities as tolerated    Nannette Hudson, PT    10/8/2024    8:11 AM  If an interpreting service was utilized for treatment of this patient, the contents of this document represent the material reviewed with the patient via the .     Future Appointments   Date Time Provider Department Center   10/8/2024  8:20 AM [facility]   10/15/2024  8:20 AM Joaquín Barajas, ESTEBAN [facility]   10/16/2024  3:20 PM [facility] NURSE [facility] [place] Sched   10/22/2024  8:20 AM [facility]   10/29/2024  8:20 AM [facility]   1/13/2025  3:30 PM [facility] CANCER BLOOD ROOM [facility] [place] Sched   1/27/2025  3:20 PM Griselda Malik, DONNELL [facility] [place] Sched

## 2024-10-15 ENCOUNTER — HOSPITAL ENCOUNTER (OUTPATIENT)
Facility: HOSPITAL | Age: 84
Setting detail: RECURRING SERIES
Discharge: HOME | End: 2024-10-18
Payer: MEDICARE

## 2024-10-15 PROCEDURE — 97112 NEUROMUSCULAR REEDUCATION: CPT

## 2024-10-15 PROCEDURE — 97110 THERAPEUTIC EXERCISES: CPT

## 2024-10-15 PROCEDURE — 97530 THERAPEUTIC ACTIVITIES: CPT

## 2024-10-15 NOTE — PROGRESS NOTES
PHYSICAL / OCCUPATIONAL THERAPY - DAILY TREATMENT NOTE    Patient Name: Arjun Jeronimo    Date: 10/15/2024    : 1940  Insurance: Payor: [employer] MEDICARE / Plan: [employer] CHOICE-PPO MEDICARE / Product Type: *No Product type* /      Patient  verified Yes     Visit #   Current / Total 5 24   Time   In / Out 8:22 9:15   Pain   In / Out 5/10- when attempting to stand, 0/10- at rest.  0/10- rest.    Subjective Functional Status/Changes: I really only get pain when attempting certain movements.      TREATMENT AREA =  Pain in left leg [M79.605]     OBJECTIVE         Therapeutic Procedures:    Tx Min Billable or 1:1 Min (if diff from Tx Min) Procedure, Rationale, Specifics   17  04339 Therapeutic Activity (timed):  use of dynamic activities replicating functional movements to increase ROM, strength, coordination, balance, and proprioception in order to improve patient's ability to progress to PLOF and address remaining functional goals.  (see flow sheet as applicable)     Details if applicable:        21810 Neuromuscular Re-Education (timed):  improve balance, coordination, kinesthetic sense, posture, core stability and proprioception to improve patient's ability to develop conscious control of individual muscles and awareness of position of extremities in order to progress to PLOF and address remaining functional goals. (see flow sheet as applicable)     Details if applicable:      36137 Therapeutic Exercise (timed):  increase ROM, strength, coordination, balance, and proprioception to improve patient's ability to progress to PLOF and address remaining functional goals. (see flow sheet as applicable)     Details if applicable:               53 53 [facility] Totals Reminder: bill using total billable min of TIMED therapeutic procedures (example: do not include dry needle or estim unattended, both untimed codes, in totals to left)  8-22 min = 1 unit; 23-37 min = 2 units; 38-52 min = 3 units; 53-67 min = 4 
progressing: pt reports 6/10 pain as worst in the morning due to stiffness (10/15/2024)  Current:      3.  Pt will improve all LE muscle strength to 4+/5 to improve strength required for functional mobility/ADL.  Status at last note/certification: Progressing: (10/15/2024)  Strength:     Right (/5) Left (/5)   Hip     Flexion 4 4-             Abduction 4 4-             Adduction  NT  NT             Extension  NT  NT             ER 4 4             IR 4+ 4+   Knee   Extension 4+ 4              Flexion 4+ 4+      Current:     3.  Pt will improve bilateral SLS to 8 seconds to improve stability at home and within the community.   Status at last note/certification: progressing: R - 2 sec, L - 2 sec (10/15/2024)  Current:      4.  Pt will improve bilateral hip internal rotation to 25 degrees to improve joint mobility and mobility at home and within the community.   Status at last note/certification:progressing: R-15 deg, L- 15 deg. (10/15/2024)  Current:       RECOMMENDATIONS  Patient would benefit from the continuation of skilled rehab interventions, per initial Plan of Care or most recent Medicare Recert, for functional progress to achieving above stated clinically significant goals.    If you have any questions/comments please contact us directly.  Thank you for allowing us to assist in the care of your patient.    Joaquín Barajas PTA       10/15/2024       8:36 AM         Therapist Signature: Joaquín Barajas PTA Date: 10/15/2024 Time:  7:08 AM

## 2024-10-22 ENCOUNTER — HOSPITAL ENCOUNTER (OUTPATIENT)
Facility: HOSPITAL | Age: 84
Setting detail: RECURRING SERIES
Discharge: HOME | End: 2024-10-25
Payer: MEDICARE

## 2024-10-22 PROCEDURE — 97110 THERAPEUTIC EXERCISES: CPT

## 2024-10-22 PROCEDURE — 97140 MANUAL THERAPY 1/> REGIONS: CPT

## 2024-10-22 NOTE — PROGRESS NOTES
note/certification: progressing: R - 2 sec, L - 2 sec (10/15/2024)  Current:      4.  Pt will improve bilateral hip internal rotation to 25 degrees to improve joint mobility and mobility at home and within the community.   Status at last note/certification:progressing: R-15 deg, L- 15 deg. (10/15/2024)  Current:     Next PN/ RC due 11/13/2024  Auth due (visit number/ date) 24 visits or 12/20/2024    PLAN  - Continue Plan of Care  - Upgrade activities as tolerated    Nannette Hudson, PT    10/22/2024    8:24 AM  If an interpreting service was utilized for treatment of this patient, the contents of this document represent the material reviewed with the patient via the .     Future Appointments   Date Time Provider Department Center   10/29/2024  8:20 AM [facility]   1/13/2025  3:30 PM [facility] CANCER BLOOD ROOM [facility] Sched   1/27/2025  3:20 PM Griselda Malik, DONNELL [facility] [place] Sched

## 2024-10-29 ENCOUNTER — HOSPITAL ENCOUNTER (OUTPATIENT)
Facility: HOSPITAL | Age: 84
Setting detail: RECURRING SERIES
Discharge: HOME | End: 2024-11-01
Payer: MEDICARE

## 2024-10-29 PROCEDURE — 97112 NEUROMUSCULAR REEDUCATION: CPT

## 2024-10-29 PROCEDURE — 97530 THERAPEUTIC ACTIVITIES: CPT

## 2024-10-29 NOTE — PROGRESS NOTES
PHYSICAL / OCCUPATIONAL THERAPY - DAILY TREATMENT NOTE    Patient Name: Arjun Jeronimo    Date: 10/29/2024    : 1940  Insurance: Payor: [employer] MEDICARE / Plan: [employer] CHOICE-PPO MEDICARE / Product Type: *No Product type* /      Patient  verified Yes     Visit #   Current / Total 2 19   Time   In / Out 8:30 9:00   Pain   In / Out 0 0   Subjective Functional Status/Changes: \"I haven't had any pain for the last couple of days\"     TREATMENT AREA =  Pain in left leg [M79.605]     OBJECTIVE         Therapeutic Procedures:    Tx Min Billable or 1:1 Min (if diff from Tx Min) Procedure, Rationale, Specifics   18 18 32264 Therapeutic Activity (timed):  use of dynamic activities replicating functional movements to increase ROM, strength, coordination, balance, and proprioception in order to improve patient's ability to progress to PLOF and address remaining functional goals.  (see flow sheet as applicable)     Details if applicable:      53568 Neuromuscular Re-Education (timed):  improve balance, coordination, kinesthetic sense, posture, core stability and proprioception to improve patient's ability to develop conscious control of individual muscles and awareness of position of extremities in order to progress to PLOF and address remaining functional goals. (see flow sheet as applicable)     Details if applicable:            Details if applicable:            Details if applicable:     30 30 [facility] Totals Reminder: bill using total billable min of TIMED therapeutic procedures (example: do not include dry needle or estim unattended, both untimed codes, in totals to left)  8-22 min = 1 unit; 23-37 min = 2 units; 38-52 min = 3 units; 53-67 min = 4 units; 68-82 min = 5 units   Total Total     [x]  Patient Education billed concurrently with other procedures   [x] Review HEP    [] Progressed/Changed HEP, detail:    [] Other detail:       Objective Information/Functional Measures/Assessment    Pt reported no pain

## 2024-11-05 ENCOUNTER — HOSPITAL ENCOUNTER (OUTPATIENT)
Facility: HOSPITAL | Age: 84
Setting detail: RECURRING SERIES
Discharge: HOME | End: 2024-11-08
Payer: MEDICARE

## 2024-11-05 PROCEDURE — 97110 THERAPEUTIC EXERCISES: CPT

## 2024-11-05 PROCEDURE — 97112 NEUROMUSCULAR REEDUCATION: CPT

## 2024-11-05 NOTE — PROGRESS NOTES
PHYSICAL / OCCUPATIONAL THERAPY - DAILY TREATMENT NOTE    Patient Name: Arjun Jeronimo    Date: 2024    : 1940  Insurance: Payor: [employer] MEDICARE / Plan: [employer] CHOICE-PPO MEDICARE / Product Type: *No Product type* /      Patient  verified Yes     Visit #   Current / Total 3 19   Time   In / Out 8:20 9:00   Pain   In / Out 0 0   Subjective Functional Status/Changes: \"I haven't had pain for about a week now\"     TREATMENT AREA =  Pain in left leg [M79.605]     OBJECTIVE         Therapeutic Procedures:    Tx Min Billable or 1:1 Min (if diff from Tx Min) Procedure, Rationale, Specifics   8 8 [id number] Therapeutic Exercise (timed):  increase ROM, strength, coordination, balance, and proprioception to improve patient's ability to progress to PLOF and address remaining functional goals. (see flow sheet as applicable)     Details if applicable:       32 32 65199 Neuromuscular Re-Education (timed):  improve balance, coordination, kinesthetic sense, posture, core stability and proprioception to improve patient's ability to develop conscious control of individual muscles and awareness of position of extremities in order to progress to PLOF and address remaining functional goals. (see flow sheet as applicable)     Details if applicable:            Details if applicable:            Details if applicable:            Details if applicable:     40 40 [facility] Totals Reminder: bill using total billable min of TIMED therapeutic procedures (example: do not include dry needle or estim unattended, both untimed codes, in totals to left)  8-22 min = 1 unit; 23-37 min = 2 units; 38-52 min = 3 units; 53-67 min = 4 units; 68-82 min = 5 units   Total Total     [x]  Patient Education billed concurrently with other procedures   [x] Review HEP    [] Progressed/Changed HEP, detail:    [] Other detail:       Objective Information/Functional Measures/Assessment    Pt reported no pain in left hip prior to today's session. Performed

## 2024-11-12 ENCOUNTER — HOSPITAL ENCOUNTER (OUTPATIENT)
Facility: HOSPITAL | Age: 84
Setting detail: RECURRING SERIES
Discharge: HOME | End: 2024-11-15
Payer: MEDICARE

## 2024-11-12 PROCEDURE — 97530 THERAPEUTIC ACTIVITIES: CPT

## 2024-11-12 PROCEDURE — 97110 THERAPEUTIC EXERCISES: CPT

## 2024-11-12 NOTE — PROGRESS NOTES
Physical Therapy Discharge Instructions      [facility]  [address] [place] [address] [phone] fax      Patient: Arjun Jeronimo  : 1940      Continue Home Exercise Program 1 times per day for 2 weeks, then decrease to 3 times per week      Continue with    [x] Ice  as needed 1 times per day     [x] Heat           Follow up with MD:     [] Upon completion of therapy     [x] As needed      Recommendations:     [x]   Return to activity with home program    []   Return to activity with the following modifications:       []Post Rehab Program    []Join Independent aquatic program     []Return to/join local gym        Additional Comments: It has been a pleasure working with you!  I'm glad you are doing better.  Keep up with your home exercises and please contact us if we can be of further assistance to you.          Sujatha Florez, PT 2024 10:07 AM

## 2024-11-12 NOTE — PROGRESS NOTES
PHYSICAL THERAPY - DISCHARGE DAILY NOTE AND SUMMARY (updated )    Patient Name: Arjun Jeronimo : 1940   Treatment/Medical Diagnosis: Pain in left leg [M79.605]   Referral Source: Xander Cervantes MD     Payor: Payor: [employer] MEDICARE / Plan: [employer] CHOICE-PPO MEDICARE / Product Type: *No Product type* /            Reporting Period : 10/15/24 to 24    Date: 2024    Patient  verified yes     Visit #   Current / Total 4 19   Time   In / Out 9:41 am 10:21 am   Pain   In / Out 0/10 0/10   Subjective Functional Status/Changes: \"I feel great. No pain.\"     TREATMENT AREA =  Pain in left leg [M79.605]    OBJECTIVE      Therapeutic Procedures:  Tx Min Billable or 1:1 Min (if diff from Tx Min) Procedure, Rationale, Specifics    20779 Therapeutic Exercise (timed):  increase ROM, strength, coordination, balance, and proprioception to improve patient's ability to progress to PLOF and address remaining functional goals. (see flow sheet as applicable)     Details if applicable:     530 Therapeutic Activity (timed):  use of dynamic activities replicating functional movements to increase ROM, strength, coordination, balance, and proprioception in order to improve patient's ability to progress to PLOF and address remaining functional goals.  (see flow sheet as applicable)     Details if applicable:  goals reassessment; HEP updated and reviewed with Pt          Details if applicable:            Details if applicable:            Details if applicable:     40 40 [facility] Totals Reminder: bill using total billable min of TIMED therapeutic procedures (example: do not include dry needle or estim unattended, both untimed codes, in totals to left)  8-22 min = 1 unit; 23-37 min = 2 units; 38-52 min = 3 units; 53-67 min = 4 units; 68-82 min = 5 units   Total Total     [x]  Patient Education billed concurrently with other procedures   [x] Review HEP    [] Progressed/Changed HEP, detail:    [] Other

## (undated) DEVICE — GAUZE,SPONGE,4"X4",12PLY,STERILE,LF,2'S: Brand: MEDLINE

## (undated) DEVICE — SYR LR LCK 1ML GRAD NSAF 30ML --

## (undated) DEVICE — NDL PRT INJ NSAF BLNT 18GX1.5 --

## (undated) DEVICE — GARMENT,MEDLINE,DVT,INT,CALF,MED, GEN2: Brand: MEDLINE

## (undated) DEVICE — TEMPLATE BRACHYTHERAPY 17GA GRID DISP FOR ACCUCARE POS AND

## (undated) DEVICE — SPACER RECTAL HYDRGEL 10ML -- SPACEOAR

## (undated) DEVICE — STERILE POLYISOPRENE POWDER-FREE SURGICAL GLOVES: Brand: PROTEXIS

## (undated) DEVICE — KIT NDL 17GA L20CM MRK L3MM DIA1.2MM SFT TISS G PLCMNT

## (undated) DEVICE — SOLUTION IRRIG 1000ML H2O STRL BLT

## (undated) DEVICE — BOWL UTIL GRAD 32OZ STRL --

## (undated) DEVICE — TABLE COVER: Brand: CONVERTORS

## (undated) DEVICE — PEN MARKING LAB --

## (undated) DEVICE — NON-STERILE (2 X 14CM) ENDOCAVITY BALLOON FOR USE WITH ACCUCARE POSITIONING AND STABILIZING EQUIPMENT: Brand: ENDOCAVITY BALLOON

## (undated) DEVICE — SYR 10ML CTRL LR LCK NSAF LF --

## (undated) DEVICE — TOWEL SURG W16XL26IN BLU NONFENESTRATED DLX ST 2 PER PK

## (undated) DEVICE — Z DISCONTINUED NO SUB IDED KIT US GEL STANDOFF

## (undated) DEVICE — BLANKET WRM AD W50XL85.8IN PACU FULL BODY FORC AIR

## (undated) DEVICE — NEEDLE HYPO 25GA L1.5IN BVL ORIENTED ECLIPSE